# Patient Record
Sex: FEMALE | URBAN - METROPOLITAN AREA
[De-identification: names, ages, dates, MRNs, and addresses within clinical notes are randomized per-mention and may not be internally consistent; named-entity substitution may affect disease eponyms.]

---

## 2024-06-13 ENCOUNTER — APPOINTMENT (RX ONLY)
Dept: URBAN - METROPOLITAN AREA CLINIC 155 | Facility: CLINIC | Age: 64
Setting detail: DERMATOLOGY
End: 2024-06-13

## 2024-06-13 VITALS — WEIGHT: 170 LBS | HEIGHT: 61 IN

## 2024-06-13 VITALS — HEIGHT: 61 IN | WEIGHT: 170 LBS

## 2024-06-13 DIAGNOSIS — L85.3 XEROSIS CUTIS: ICD-10-CM | Status: INADEQUATELY CONTROLLED

## 2024-06-13 DIAGNOSIS — L23.9 ALLERGIC CONTACT DERMATITIS, UNSPECIFIED CAUSE: ICD-10-CM | Status: INADEQUATELY CONTROLLED

## 2024-06-13 PROCEDURE — ? PRESCRIPTION MEDICATION MANAGEMENT

## 2024-06-13 PROCEDURE — ? PRESCRIPTION

## 2024-06-13 PROCEDURE — 99203 OFFICE O/P NEW LOW 30 MIN: CPT

## 2024-06-13 PROCEDURE — ? COUNSELING

## 2024-06-13 RX ORDER — TRIAMCINOLONE ACETONIDE 0.25 MG/G
CREAM TOPICAL
Qty: 80 | Refills: 0 | Status: ERX | COMMUNITY
Start: 2024-06-13

## 2024-06-13 RX ADMIN — TRIAMCINOLONE ACETONIDE: 0.25 CREAM TOPICAL at 00:00

## 2024-06-13 ASSESSMENT — LOCATION SIMPLE DESCRIPTION DERM: LOCATION SIMPLE: LEFT ANTERIOR NECK

## 2024-06-13 ASSESSMENT — LOCATION ZONE DERM: LOCATION ZONE: NECK

## 2024-06-13 ASSESSMENT — LOCATION DETAILED DESCRIPTION DERM: LOCATION DETAILED: LEFT INFERIOR ANTERIOR NECK

## 2024-06-13 NOTE — HPI: RASH
What Type Of Note Output Would You Prefer (Optional)?: Standard Output
How Severe Is Your Rash?: moderate
Is This A New Presentation, Or A Follow-Up?: Rash
Niacinamide Counseling: I recommended taking niacin or niacinamide, also know as vitamin B3, twice daily. Recent evidence suggests that taking vitamin B3 (500 mg twice daily) can reduce the risk of actinic keratoses and non-melanoma skin cancers. Side effects of vitamin B3 include flushing and headache.

## 2024-06-13 NOTE — PROCEDURE: PRESCRIPTION MEDICATION MANAGEMENT
Initiate Treatment: triamcinolone acetonide 0.025 % topical cream \\nQuantity: 80.0 g  Days Supply: 30\\nSig: Apply to neck   1-2 times daily 2 weeks on, 2 weeks off. Repeat as needed
Render In Strict Bullet Format?: No
Plan: Pt reports increase itch around neck. Pt reports that she can not correlate anything that has caused the itch. Minimal to no rash visible on exam. Recommend otc antihistamine Allegra. Continue Benadryl at bedtime. Follow up 2 weeks, if no improvement will consider biopsy.
Detail Level: Zone

## 2024-06-27 ENCOUNTER — APPOINTMENT (RX ONLY)
Dept: URBAN - METROPOLITAN AREA CLINIC 155 | Facility: CLINIC | Age: 64
Setting detail: DERMATOLOGY
End: 2024-06-27

## 2024-06-27 DIAGNOSIS — L85.3 XEROSIS CUTIS: ICD-10-CM | Status: INADEQUATELY CONTROLLED

## 2024-06-27 DIAGNOSIS — M31.6 OTHER GIANT CELL ARTERITIS: ICD-10-CM | Status: INADEQUATELY CONTROLLED

## 2024-06-27 DIAGNOSIS — L55.0 SUNBURN OF FIRST DEGREE: ICD-10-CM | Status: RESOLVING

## 2024-06-27 PROBLEM — L30.9 DERMATITIS, UNSPECIFIED: Status: ACTIVE | Noted: 2024-06-27

## 2024-06-27 PROCEDURE — ? ORDER TESTS

## 2024-06-27 PROCEDURE — ? COUNSELING

## 2024-06-27 PROCEDURE — ? PRESCRIPTION

## 2024-06-27 PROCEDURE — 99214 OFFICE O/P EST MOD 30 MIN: CPT

## 2024-06-27 PROCEDURE — ? PRESCRIPTION MEDICATION MANAGEMENT

## 2024-06-27 RX ORDER — PREDNISONE 10 MG/1
TABLET ORAL QAM
Qty: 50 | Refills: 0 | Status: ERX | COMMUNITY
Start: 2024-06-27

## 2024-06-27 RX ADMIN — PREDNISONE: 10 TABLET ORAL at 00:00

## 2024-06-27 ASSESSMENT — LOCATION ZONE DERM: LOCATION ZONE: NECK

## 2024-06-27 ASSESSMENT — LOCATION DETAILED DESCRIPTION DERM
LOCATION DETAILED: RIGHT INFERIOR ANTERIOR NECK
LOCATION DETAILED: LEFT INFERIOR ANTERIOR NECK

## 2024-06-27 ASSESSMENT — LOCATION SIMPLE DESCRIPTION DERM
LOCATION SIMPLE: LEFT ANTERIOR NECK
LOCATION SIMPLE: RIGHT ANTERIOR NECK

## 2024-06-27 NOTE — PROCEDURE: PRESCRIPTION MEDICATION MANAGEMENT
Detail Level: Zone
Render In Strict Bullet Format?: No
Continue Regimen: triamcinolone acetonide 0.025 % topical cream \\nQuantity: 80.0 g  Days Supply: 30\\nSig: Apply to neck   1-2 times daily 2 weeks on, 2 weeks off. Repeat as need
Initiate Treatment: prednisone 10 mg tablet QAM\\nQuantity: 50.0 Tablet\\nSig: Take 4 pills for 5 days, take 3 pills for 5 days, take 2 pills for 5 days, take 1 pill for 5 days.
Plan: Pt reports that she has had minimal to no improvement with her topical steroids. Pt reports that she is intensely itchy. Pt has no visible rash on exam. Pt reports that if she touches her neck then the itch is extreme. Pt is extremely tender to palpation on lateral sides of neck. Pt reports that she does have a history of Giant Cell Arteritis but had completed treatment, which was confirmed with biopsy. Pt has extreme tenderness to her right temple. Pt reports no changes in her vision. Pt reports stiffness of neck. Will initiate prednisone, pt has HTN, pt to monitor her blood pressure. pt already take a daily baby aspirin.\\nPt has continuing follow ups with her rheumatologist. Pt instructed to follow up with her rheumatologist as soon as possible, lab order slip provided to pt. Pt instructed to start prednisone after labs so results aren’t altered. \\nPt given ER precautions and notified to go to ER for extreme pain, stroke like symptoms, or vision changes.

## 2024-06-27 NOTE — PROCEDURE: ORDER TESTS
Expected Date Of Service: 06/27/2024
Bill For Surgical Tray: no
Billing Type: Third-Party Bill
Performing Laboratory: [id number]

## 2024-07-16 ENCOUNTER — APPOINTMENT (RX ONLY)
Dept: URBAN - METROPOLITAN AREA CLINIC 155 | Facility: CLINIC | Age: 64
Setting detail: DERMATOLOGY
End: 2024-07-16

## 2024-07-16 DIAGNOSIS — M31.6 OTHER GIANT CELL ARTERITIS: ICD-10-CM | Status: IMPROVED

## 2024-07-16 PROBLEM — L30.9 DERMATITIS, UNSPECIFIED: Status: ACTIVE | Noted: 2024-07-16

## 2024-07-16 PROCEDURE — 99214 OFFICE O/P EST MOD 30 MIN: CPT

## 2024-07-16 PROCEDURE — ? PRESCRIPTION MEDICATION MANAGEMENT

## 2024-07-16 PROCEDURE — ? COUNSELING

## 2024-07-16 PROCEDURE — ? PRESCRIPTION

## 2024-07-16 NOTE — PROCEDURE: PRESCRIPTION MEDICATION MANAGEMENT
Detail Level: Zone
Render In Strict Bullet Format?: No
Continue Regimen: triamcinolone acetonide 0.025 % topical cream \\nQuantity: 80.0 g  Days Supply: 30\\nSig: Apply to neck   1-2 times daily 2 weeks on, 2 weeks off. Repeat as need
Initiate Treatment: prednisone 10 mg tablet \\nQuantity: 90.0 Tablet  Days Supply: 30\\nSig: Take 3 pills (30 mg) once daily
Plan: Pt reports that she just got back from vacation and has not been contacted by her Rheumatologist and has not scheduled an appointment with her PCP. Pt strongly encouraged to make apt with both providers due to high suspision for [facility]CA. Pt reports that her symptoms had improved on the prednisone taper but since the last couple days of being off the sensitivity has started to return. pt still symptomatic to palpation. Pt’s CRP was within normal limits but patient’s presentation has strong concerns for inflammation. Will continue a maintenance dose of prednisone until patient is able to be evaluated by either provider. Patient notified that this will be the last refill of prednisone and will need to have her Rheumatologist or other provider continue care if appropriate. Patient verbalized understanding.\\nPatient reports that she is taking calcium and vitamin D supplementation.\\n\\n-Dr. Pham consulted and helped create plan of care.

## 2024-07-17 RX ORDER — PREDNISONE 10 MG/1
TABLET ORAL
Qty: 90 | Refills: 0 | Status: ERX